# Patient Record
Sex: FEMALE | Race: WHITE | NOT HISPANIC OR LATINO | Employment: FULL TIME | URBAN - NONMETROPOLITAN AREA
[De-identification: names, ages, dates, MRNs, and addresses within clinical notes are randomized per-mention and may not be internally consistent; named-entity substitution may affect disease eponyms.]

---

## 2022-05-03 ENCOUNTER — OFFICE VISIT (OUTPATIENT)
Dept: FAMILY MEDICINE CLINIC | Facility: CLINIC | Age: 37
End: 2022-05-03

## 2022-05-03 VITALS
DIASTOLIC BLOOD PRESSURE: 80 MMHG | HEIGHT: 71 IN | SYSTOLIC BLOOD PRESSURE: 128 MMHG | OXYGEN SATURATION: 95 % | HEART RATE: 69 BPM

## 2022-05-03 DIAGNOSIS — R09.81 NASAL CONGESTION: ICD-10-CM

## 2022-05-03 DIAGNOSIS — H92.03 EAR PAIN, BILATERAL: Primary | ICD-10-CM

## 2022-05-03 PROCEDURE — 99213 OFFICE O/P EST LOW 20 MIN: CPT | Performed by: PHYSICIAN ASSISTANT

## 2022-05-03 RX ORDER — CHLORCYCLIZINE HYDROCHLORIDE AND PSEUDOEPHEDRINE HYDROCHLORIDE 25; 60 MG/1; MG/1
TABLET ORAL
Qty: 20 TABLET | Refills: 0 | Status: SHIPPED | OUTPATIENT
Start: 2022-05-03

## 2022-05-03 RX ORDER — FLUTICASONE PROPIONATE 50 MCG
2 SPRAY, SUSPENSION (ML) NASAL DAILY
Qty: 15.8 ML | Refills: 3 | Status: SHIPPED | OUTPATIENT
Start: 2022-05-03

## 2022-05-03 RX ORDER — METHYLPREDNISOLONE 4 MG/1
TABLET ORAL
Qty: 1 EACH | Refills: 0 | Status: SHIPPED | OUTPATIENT
Start: 2022-05-03

## 2022-05-03 RX ORDER — NORETHINDRONE ACETATE AND ETHINYL ESTRADIOL 1MG-20(21)
KIT ORAL
COMMUNITY
Start: 2022-04-02

## 2022-05-03 RX ORDER — ESOMEPRAZOLE MAGNESIUM 20 MG/1
TABLET, DELAYED RELEASE ORAL
COMMUNITY

## 2022-05-06 NOTE — PROGRESS NOTES
"Chief Complaint  Earache (Patient reports having an earache since Friday, she also has has congestion. No fever. Pt states reports that also has a headache)    Subjective          Ne Byrd presents to [facility]  Patient reports today secondary to having ear pain and nasal congestion.  States she has taken OTC meds however no relief.  Reports no known sick contacts.  Denies any fever, chills, nausea or vomiting      Objective   Vital Signs:  /80 (BP Location: Left arm, Patient Position: Sitting, Cuff Size: Adult)   Pulse 69   Ht 180.3 cm (71\")   SpO2 95%     BMI has not been calculated during today's encounter.       Physical Exam  Vitals and nursing note reviewed.   Constitutional:       General: She is not in acute distress.     Appearance: Normal appearance.   HENT:      Head: Normocephalic.      Right Ear: Hearing and tympanic membrane normal.      Left Ear: Hearing and tympanic membrane normal.   Eyes:      Pupils: Pupils are equal, round, and reactive to light.   Cardiovascular:      Rate and Rhythm: Normal rate and regular rhythm.   Pulmonary:      Effort: Pulmonary effort is normal. No respiratory distress.   Skin:     General: Skin is warm and dry.   Neurological:      Mental Status: She is alert.   Psychiatric:         Mood and Affect: Mood normal.        Result Review :                 Assessment and Plan    Diagnoses and all orders for this visit:    1. Ear pain, bilateral (Primary)  Assessment & Plan:  Patient normal on physical exam advised this could be secondary to allergies or viral. Prescribed steroid taper, stahist and flonase for possible relief. Patient however declined flu and COVID testing this date.      Orders:  -     methylPREDNISolone (MEDROL) 4 MG dose pack; Take as directed on package instructions with food.  Dispense: 1 each; Refill: 0    2. Nasal congestion  Assessment & Plan:  See plan above    Orders:  -     Cancel: COVID-19 RAPID " AG,VERITOR,COR/CORA/PAD/SEFERINO/MAD/FANTA/LAG/TERRIE/ IN-HOUSE,DRY SWAB, 1-2 HR TAT - Swab, Nasal Cavity; Future  -     Cancel: Influenza Antigen, Rapid - Swab, Nasopharynx; Future  -     fluticasone (Flonase) 50 MCG/ACT nasal spray; 2 sprays into the nostril(s) as directed by provider Daily.  Dispense: 15.8 mL; Refill: 3  -     Chlorcyclizine-Pseudoephed (Stahist AD) 25-60 MG tablet; Take 1 tab po BID as needed for drainage and/or congestion.  Dispense: 20 tablet; Refill: 0           Follow Up   No follow-ups on file.  Patient was given instructions and counseling regarding her condition or for health maintenance advice. Please see specific information pulled into the AVS if appropriate.

## 2022-05-06 NOTE — ASSESSMENT & PLAN NOTE
Patient normal on physical exam advised this could be secondary to allergies or viral. Prescribed steroid taper, stahist and flonase for possible relief. Patient however declined flu and COVID testing this date.

## 2023-12-04 ENCOUNTER — TELEPHONE (OUTPATIENT)
Dept: FAMILY MEDICINE CLINIC | Facility: CLINIC | Age: 38
End: 2023-12-04
Payer: COMMERCIAL

## 2023-12-04 NOTE — TELEPHONE ENCOUNTER
Caller: Ne Byrd    Relationship to patient: Self    Best call back number: [phone]     Chief complaint: COUGH, CONGESTION, SINUS PRESSURE, GREEN MUCUS, RUNNY NOSE, TESTED POSITIVE FOR COVID 11-28-23    Type of visit: MYCHART OR OFFICE VISIT    Requested date: AS SOON AS POSSIBLE     If rescheduling, when is the original appointment: 12-8-23     Additional notes:PATIENT IS WILLING TO SEE ANYONE IN THE OFFICE.

## 2023-12-04 NOTE — TELEPHONE ENCOUNTER
Name: Ne Byrd    Relationship: Self    Best Callback Number: [phone]    HUB PROVIDED THE RELAY MESSAGE FROM THE OFFICE   PATIENT VOICED UNDERSTANDING AND HAS NO FURTHER QUESTIONS AT THIS TIME

## 2023-12-14 ENCOUNTER — TELEMEDICINE (OUTPATIENT)
Dept: FAMILY MEDICINE CLINIC | Facility: CLINIC | Age: 38
End: 2023-12-14
Payer: COMMERCIAL

## 2023-12-14 DIAGNOSIS — R05.1 ACUTE COUGH: ICD-10-CM

## 2023-12-14 DIAGNOSIS — J32.9 RECURRENT SINUSITIS: Primary | ICD-10-CM

## 2023-12-14 RX ORDER — DOXYCYCLINE HYCLATE 100 MG/1
100 CAPSULE ORAL 2 TIMES DAILY
Qty: 20 CAPSULE | Refills: 0 | Status: SHIPPED | OUTPATIENT
Start: 2023-12-14

## 2023-12-14 RX ORDER — PREDNISONE 20 MG/1
TABLET ORAL
Qty: 24 TABLET | Refills: 0 | Status: SHIPPED | OUTPATIENT
Start: 2023-12-14

## 2023-12-14 RX ORDER — DEXTROMETHORPHAN HYDROBROMIDE AND PROMETHAZINE HYDROCHLORIDE 15; 6.25 MG/5ML; MG/5ML
5 SYRUP ORAL 4 TIMES DAILY PRN
Qty: 240 ML | Refills: 3 | Status: SHIPPED | OUTPATIENT
Start: 2023-12-14

## 2023-12-14 RX ORDER — FLUCONAZOLE 150 MG/1
150 TABLET ORAL ONCE
Qty: 1 TABLET | Refills: 1 | Status: SHIPPED | OUTPATIENT
Start: 2023-12-14 | End: 2023-12-14

## 2023-12-14 NOTE — ASSESSMENT & PLAN NOTE
Reviewed treatment and discussed further treatment and workup options.  She will start with a prednisone taper and may continue with Stahist days for congestion relief.  If not improving or worsening she will add in doxycycline.    Given prescription for Diflucan due to history of yeast infections with antibiotics.    Discussed side effects and expectations with doxycycline and it can interact with birth control.    Given Phenergan DM for p.m. cough and congestion.    Close follow-up if no improvement or worsening

## 2023-12-14 NOTE — PROGRESS NOTES
Patient was seen today through synchronous audio/video technology. Verbal consent was obtained. The patient was located at home. Vitals signs were not obtained due to lack of home monitoring access.     I was located at our [facility] office for this telehealth visit.    Chief Complaint  No chief complaint on file.    History of Present Illness  Ne Byrd is a 38 y.o. female presenting via video-conference today for congestion, ear pain, sinusitis.    COVID pos 11/28/23.  Worsening sinus pain/pressure.    Started Augmentin for 5 days.  Purulent drainage improved.  Ongoing problems with significant sinus pain and pressure.  Congested cough as well.  She has been on Stahist with Flonase without improvement            The following portions of the patient's history were reviewed and updated as appropriate: allergies, current medications, past family history, past medical history, past social history, past surgical history and problem list.    Review of Systems   Constitutional:  Negative for appetite change, chills, fever and unexpected weight change.   HENT:  Positive for congestion, sinus pressure and sinus pain. Negative for hearing loss.    Eyes:  Negative for visual disturbance.   Respiratory:  Positive for cough. Negative for chest tightness, shortness of breath and wheezing.    Cardiovascular:  Negative for chest pain, palpitations and leg swelling.   Gastrointestinal:  Negative for abdominal pain.   Musculoskeletal:  Negative for arthralgias, back pain and gait problem.   Skin:  Negative for rash.   Neurological:  Negative for dizziness and headaches.   Psychiatric/Behavioral:  Negative for agitation and confusion. The patient is not nervous/anxious.        Objective  There were no vitals taken for this visit.    Physical Exam  Constitutional:       General: She is not in acute distress.     Appearance: Normal appearance. She is not ill-appearing.   HENT:      Head:  Normocephalic and atraumatic.      Right Ear: External ear normal.      Left Ear: External ear normal.      Nose: Nose normal. Congestion present.   Eyes:      Extraocular Movements: Extraocular movements intact.      Conjunctiva/sclera: Conjunctivae normal.      Pupils: Pupils are equal, round, and reactive to light.   Pulmonary:      Effort: Pulmonary effort is normal. No respiratory distress.   Musculoskeletal:         General: Normal range of motion.      Cervical back: Normal range of motion.   Skin:     Findings: No rash.   Neurological:      General: No focal deficit present.      Mental Status: She is alert and oriented to person, place, and time.   Psychiatric:         Mood and Affect: Mood normal.         Behavior: Behavior normal.         Thought Content: Thought content normal.           Assessment/Plan   Diagnoses and all orders for this visit:    1. Recurrent sinusitis (Primary)  Assessment & Plan:  Reviewed treatment and discussed further treatment and workup options.  She will start with a prednisone taper and may continue with Stahist days for congestion relief.  If not improving or worsening she will add in doxycycline.    Given prescription for Diflucan due to history of yeast infections with antibiotics.    Discussed side effects and expectations with doxycycline and it can interact with birth control.    Given Phenergan DM for p.m. cough and congestion.    Close follow-up if no improvement or worsening    Orders:  -     predniSONE (DELTASONE) 20 MG tablet; 3 po QAM x 4d, then 2 po QAM x 4d, then 1 po QAM x 4 d, then stop (Take in AM with food)  Dispense: 24 tablet; Refill: 0  -     doxycycline (VIBRAMYCIN) 100 MG capsule; Take 1 capsule by mouth 2 (Two) Times a Day. (Take 2 hrs after eating with water to prevent GI upset)  Dispense: 20 capsule; Refill: 0    2. Acute cough  -     promethazine-dextromethorphan (PROMETHAZINE-DM) 6.25-15 MG/5ML syrup; Take 5 mL by mouth 4 (Four) Times a Day As  Needed for Cough.  Dispense: 240 mL; Refill: 3    Other orders  -     fluconazole (Diflucan) 150 MG tablet; Take 1 tablet by mouth 1 (One) Time for 1 dose.  Dispense: 1 tablet; Refill: 1        BMI cannot be calculated due to outdated height or weight values.  Please input a current height/weight in Vitals and re-renter BMIFOLLOWUP in Note to pull in correct documentation based on BMI range.       Return if symptoms worsen or fail to improve.    Zachary Bueno MD

## 2023-12-20 ENCOUNTER — PATIENT MESSAGE (OUTPATIENT)
Dept: FAMILY MEDICINE CLINIC | Facility: CLINIC | Age: 38
End: 2023-12-20
Payer: COMMERCIAL

## 2023-12-20 DIAGNOSIS — J32.9 RECURRENT SINUSITIS: ICD-10-CM

## 2023-12-20 RX ORDER — PREDNISONE 20 MG/1
TABLET ORAL
Qty: 24 TABLET | Refills: 0 | Status: CANCELLED | OUTPATIENT
Start: 2023-12-20

## 2023-12-21 RX ORDER — BROMPHENIRAMINE MALEATE, PSEUDOEPHEDRINE HYDROCHLORIDE, AND DEXTROMETHORPHAN HYDROBROMIDE 2; 30; 10 MG/5ML; MG/5ML; MG/5ML
5 SYRUP ORAL 4 TIMES DAILY PRN
Qty: 118 ML | Refills: 3 | Status: SHIPPED | OUTPATIENT
Start: 2023-12-21

## 2023-12-21 NOTE — TELEPHONE ENCOUNTER
From: Ne Byrd  To: Zachary Bueno  Sent: 12/20/2023 5:56 PM EST  Subject: Ongoing cough     Hello!!! This message is for Dr. Bueno. I have used almost all of the cough syrup prescribed to me. It usually helps me...however, I'm still experiencing a cough all day long and have been sick now for weeks. Have also taken the steroid and the antibiotic prescribed. Going into the holidays, I'm trying everything to get better!! Is there a different and stronger/better cough syrup you can call in for me? Thanks so much!!!

## 2023-12-21 NOTE — TELEPHONE ENCOUNTER
I sent in [facility] DM for cough/congestion but the prednisone refill request was not filled because she should not take another course of steroids    I hope the cough syrup change helps and this has a decongestant to help with congestion as well as cough medication and antihistamine

## 2024-01-08 ENCOUNTER — PATIENT MESSAGE (OUTPATIENT)
Dept: FAMILY MEDICINE CLINIC | Facility: CLINIC | Age: 39
End: 2024-01-08
Payer: COMMERCIAL

## 2024-01-08 RX ORDER — BROMPHENIRAMINE MALEATE, PSEUDOEPHEDRINE HYDROCHLORIDE, AND DEXTROMETHORPHAN HYDROBROMIDE 2; 30; 10 MG/5ML; MG/5ML; MG/5ML
5 SYRUP ORAL 4 TIMES DAILY PRN
Qty: 118 ML | Refills: 3 | Status: SHIPPED | OUTPATIENT
Start: 2024-01-08 | End: 2024-01-11 | Stop reason: SDUPTHER

## 2024-01-11 RX ORDER — BROMPHENIRAMINE MALEATE, PSEUDOEPHEDRINE HYDROCHLORIDE, AND DEXTROMETHORPHAN HYDROBROMIDE 2; 30; 10 MG/5ML; MG/5ML; MG/5ML
5 SYRUP ORAL 4 TIMES DAILY PRN
Qty: 118 ML | Refills: 3 | Status: SHIPPED | OUTPATIENT
Start: 2024-01-11

## 2024-01-11 NOTE — TELEPHONE ENCOUNTER
"From: Ne Byrd  To: Zachary Bueno  Sent: 1/8/2024 1:54 PM EST  Subject: Ongoing cough    Hey Dr. Bueno... I was never able to pickup the bromphed DM. Reese said it wasn't called in. My cough is not bad but hanging on. I heard in the news about this \"100 day cough\" going around...not sure of your thoughts, but I know I've been coughing now for over 50 days. Again it's not nearly as severe as it was early on, but is hanging on and worse at night (Keeps me up). I'd like to try the medication you called in or if you think something else before we do any X-rays, etc. what do you think? Thanks!!!  "

## 2024-03-06 ENCOUNTER — TELEPHONE (OUTPATIENT)
Dept: FAMILY MEDICINE CLINIC | Facility: CLINIC | Age: 39
End: 2024-03-06
Payer: COMMERCIAL

## 2024-03-06 NOTE — TELEPHONE ENCOUNTER
Caller: Ne Byrd    Relationship to patient: Self    Best call back number: [phone]     Patient is needing: PATIENT IS NEEDING INFORMATION ON OBTAINING A PERMANENT HANDICAP PLACARD FOR HER HIP ISSUES THAT WILL REMAIN ONGOING. SHE WAS TOLD HER PRIMARY CARE PROVIDER WOULD NEED TO ISSUE THIS TO HER.     PATIENT STATES SHE HAS BEEN DIAGNOSED WITH HIP DYSPLASIA AND A LABRAL TEAR, AND SHE CAN SEND OVER HER PAPERWORK AND CLINICAL NOTES IF NECESSARY.     PLEASE CALL PATIENT BACK WITH NEXT STEPS, IF NO ANSWER LEAVE A DETAILED MESSAGE.

## 2024-03-07 NOTE — TELEPHONE ENCOUNTER
Please have her setup a telehealth visit - I can get everything documented in her note and discuss the process for handicap permit completion.

## 2024-03-12 ENCOUNTER — TELEMEDICINE (OUTPATIENT)
Dept: FAMILY MEDICINE CLINIC | Facility: CLINIC | Age: 39
End: 2024-03-12
Payer: COMMERCIAL

## 2024-03-12 DIAGNOSIS — M25.551 BILATERAL HIP PAIN: Primary | ICD-10-CM

## 2024-03-12 DIAGNOSIS — S73.191A TEAR OF RIGHT ACETABULAR LABRUM, INITIAL ENCOUNTER: ICD-10-CM

## 2024-03-12 DIAGNOSIS — Q65.89 HIP DYSPLASIA: ICD-10-CM

## 2024-03-12 DIAGNOSIS — M25.552 BILATERAL HIP PAIN: Primary | ICD-10-CM

## 2024-03-12 NOTE — PROGRESS NOTES
Patient was seen today through synchronous audio/video technology. Verbal consent was obtained. The patient was located at home. Vitals signs were not obtained due to lack of home monitoring access.     I was located at our [facility] office for this telehealth visit.    Chief Complaint  Bilateral hip pain, diagnosis of congenital hip dysplasia, R hip labral tear    History of Present Illness  Ne Byrd is a 38 y.o. female presenting via video-conference today for follow-up visit regarding problems with recurrent bilateral hip pain which has been greater on the right side.  She did have workup with Dr. 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    Unfortunately given her hip dysplasia contributed to the labral tear they do not feel that repairing the labral tear will be very effective because the underlying structural differences will cause a repeat tear.    She has had flareups with both hip and lower back pain and has difficulty sleeping at night with this pain.  She is affected with walking even short distances with her hip and back pain and would like to request a handicap permit to help reduce the distance that she has to travel when parking in a parking lot.    She does have follow-up with interventional pain management to discuss some injection options due to her ongoing pain related to her congenital hip dysplasia and labral tear.    The following portions of the patient's history were reviewed and updated as appropriate: allergies, current medications, past family history, past medical history, past social history, past surgical history and problem list.    Review of Systems   Constitutional:  Negative for appetite change, chills, fever and unexpected weight change.   HENT:  Negative for hearing loss.    Eyes:  Negative for visual disturbance.   Respiratory:  Negative for  chest tightness, shortness of breath and wheezing.    Cardiovascular:  Negative for chest pain, palpitations and leg swelling.   Gastrointestinal:  Negative for abdominal pain.   Musculoskeletal:  Positive for arthralgias, back pain and gait problem.        See HPI   Skin:  Negative for rash.   Neurological:  Negative for dizziness and headaches.   Psychiatric/Behavioral:  Positive for sleep disturbance. Negative for agitation and confusion. The patient is not nervous/anxious.        Objective  There were no vitals taken for this visit.    Physical Exam  Constitutional:       General: She is not in acute distress.     Appearance: Normal appearance. She is not ill-appearing.   HENT:      Head: Normocephalic and atraumatic.      Right Ear: External ear normal.      Left Ear: External ear normal.      Nose: Nose normal.   Eyes:      Extraocular Movements: Extraocular movements intact.      Conjunctiva/sclera: Conjunctivae normal.      Pupils: Pupils are equal, round, and reactive to light.   Pulmonary:      Effort: Pulmonary effort is normal. No respiratory distress.   Musculoskeletal:      Cervical back: Normal range of motion.      Comments: Ongoing problems with hip pain greater on right side at site of labral tear.  New diagnosis of congenital hip dysplasia and right-sided labral tear since our last visit   Skin:     Findings: No rash.   Neurological:      General: No focal deficit present.      Mental Status: She is alert and oriented to person, place, and time.   Psychiatric:         Mood and Affect: Mood normal.         Behavior: Behavior normal.         Thought Content: Thought content normal.           Assessment/Plan   Diagnoses and all orders for this visit:    1. Bilateral hip pain (Primary)  Assessment & Plan:  We discussed together her workup with orthopedics here in [place] and referral to  orthopedics with diagnosis of bilateral hip congenital dysplasia and right labral tear.    This has been very  frustrating news given that it is a long-term problem and there is not a simple solution but we are hopeful that interventional pain management can offer some conservative treatment that will help alleviate some of her symptoms for a while but she may need eventually hip replacement surgery.    We did discuss the handicap permit and this was completed today and she will stop by the office and pick it up so we can help with some of the distances that she has to walk when parking her car.    She does understand that I am here to help in any way and will keep me up-to-date regarding her interventional pain consultation and treatment recommendations from her orthopedic specialist at       2. Hip dysplasia  Assessment & Plan:  See above      3. Tear of right acetabular labrum, initial encounter  Assessment & Plan:  See above             Return if symptoms worsen or fail to improve.    Zachary Bueno MD

## 2024-03-12 NOTE — ASSESSMENT & PLAN NOTE
We discussed together her workup with orthopedics here in [place] and referral to [facility] with diagnosis of bilateral hip congenital dysplasia and right labral tear.    This has been very frustrating news given that it is a long-term problem and there is not a simple solution but we are hopeful that interventional pain management can offer some conservative treatment that will help alleviate some of her symptoms for a while but she may need eventually hip replacement surgery.    We did discuss the handicap permit and this was completed today and she will stop by the office and pick it up so we can help with some of the distances that she has to walk when parking her car.    She does understand that I am here to help in any way and will keep me up-to-date regarding her interventional pain consultation and treatment recommendations from her orthopedic specialist at

## 2024-06-25 ENCOUNTER — PATIENT MESSAGE (OUTPATIENT)
Dept: FAMILY MEDICINE CLINIC | Facility: CLINIC | Age: 39
End: 2024-06-25
Payer: COMMERCIAL

## 2024-06-26 NOTE — TELEPHONE ENCOUNTER
From: Ne Byrd  To: Zachary Bueno  Sent: 6/25/2024 1:50 PM EDT  Subject: Handicap parking    Hi Dr. Bueno. I did a virtual visit with you recently to discuss and get your approval for a handicapped sticker due to my diagnosed his dysplasia and labral tear. 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 I have attached it or can bring it by if that's better. Would you care to sign this for me? Thanks so much

## 2024-06-28 ENCOUNTER — OFFICE VISIT (OUTPATIENT)
Dept: FAMILY MEDICINE CLINIC | Facility: CLINIC | Age: 39
End: 2024-06-28
Payer: COMMERCIAL

## 2024-06-28 VITALS
TEMPERATURE: 97.1 F | BODY MASS INDEX: 34.3 KG/M2 | DIASTOLIC BLOOD PRESSURE: 74 MMHG | SYSTOLIC BLOOD PRESSURE: 106 MMHG | OXYGEN SATURATION: 94 % | HEART RATE: 73 BPM | WEIGHT: 245 LBS | HEIGHT: 71 IN

## 2024-06-28 DIAGNOSIS — R50.9 FEVER AND CHILLS: ICD-10-CM

## 2024-06-28 DIAGNOSIS — B34.9 ACUTE VIRAL SYNDROME: Primary | ICD-10-CM

## 2024-06-28 LAB
BILIRUB BLD-MCNC: NEGATIVE MG/DL
CLARITY, POC: CLEAR
COLOR UR: YELLOW
EXPIRATION DATE: NORMAL
EXPIRATION DATE: NORMAL
FLUAV AG UPPER RESP QL IA.RAPID: NOT DETECTED
FLUBV AG UPPER RESP QL IA.RAPID: NOT DETECTED
GLUCOSE UR STRIP-MCNC: NEGATIVE MG/DL
INTERNAL CONTROL: NORMAL
KETONES UR QL: NEGATIVE
LEUKOCYTE EST, POC: NEGATIVE
Lab: NORMAL
Lab: NORMAL
NITRITE UR-MCNC: NEGATIVE MG/ML
PH UR: 6 [PH] (ref 5–8)
PROT UR STRIP-MCNC: NEGATIVE MG/DL
RBC # UR STRIP: NEGATIVE /UL
SARS-COV-2 AG UPPER RESP QL IA.RAPID: NOT DETECTED
SP GR UR: 1 (ref 1–1.03)
UROBILINOGEN UR QL: NORMAL

## 2024-06-28 PROCEDURE — 81003 URINALYSIS AUTO W/O SCOPE: CPT | Performed by: PHYSICIAN ASSISTANT

## 2024-06-28 PROCEDURE — 87428 SARSCOV & INF VIR A&B AG IA: CPT | Performed by: PHYSICIAN ASSISTANT

## 2024-06-28 PROCEDURE — 99213 OFFICE O/P EST LOW 20 MIN: CPT | Performed by: PHYSICIAN ASSISTANT

## 2024-06-28 RX ORDER — ESOMEPRAZOLE MAGNESIUM 20 MG/1
GRANULE, DELAYED RELEASE ORAL
COMMUNITY

## 2024-06-28 RX ORDER — TIZANIDINE 4 MG/1
4 TABLET ORAL NIGHTLY PRN
COMMUNITY
End: 2024-06-28 | Stop reason: SDUPTHER

## 2024-06-28 RX ORDER — ONDANSETRON 4 MG/1
4 TABLET, FILM COATED ORAL EVERY 8 HOURS PRN
Qty: 20 TABLET | Refills: 0 | Status: SHIPPED | OUTPATIENT
Start: 2024-06-28

## 2024-06-28 RX ORDER — TIZANIDINE 4 MG/1
4 TABLET ORAL NIGHTLY PRN
Qty: 30 TABLET | Refills: 3 | Status: SHIPPED | OUTPATIENT
Start: 2024-06-28

## 2024-06-28 RX ORDER — TRAMADOL HYDROCHLORIDE 50 MG/1
TABLET ORAL 2 TIMES DAILY
COMMUNITY
Start: 2024-06-11

## 2024-06-28 RX ORDER — GABAPENTIN 600 MG/1
TABLET ORAL
COMMUNITY
Start: 2024-06-06

## 2024-07-05 NOTE — PROGRESS NOTES
"Chief Complaint  Back Pain (Started in left side now in both sides.), Nausea (Stomach hurts and feels nauseas. Woke up with really bad headache.), and Menstrual Problem    Subjective        Ne Byrd presents to [facility]  History of Present Illness  Patient reports this date secondary to not feeling well starting this morning.  Patient reports feeling feverish and having chills.  Denies any known temperature.  Patient reports she is menstruating and has low back pain.  Reports fullness in lower abdomen is concerned for UTI.  States she has nausea.  Denies any SOB, chest pain or difficulty breathing.  Denies any known sick contacts  Back Pain    Nausea  Associated symptoms include nausea.   Menstrual Problem  Associated symptoms include nausea.       Objective   Vital Signs:  /74 (BP Location: Left arm, Patient Position: Sitting, Cuff Size: Adult)   Pulse 73   Temp 97.1 °F (36.2 °C) (Infrared)   Ht 180.3 cm (70.98\")   Wt 111 kg (245 lb)   SpO2 94%   BMI 34.19 kg/m²   Estimated body mass index is 34.19 kg/m² as calculated from the following:    Height as of this encounter: 180.3 cm (70.98\").    Weight as of this encounter: 111 kg (245 lb).             Physical Exam  Vitals and nursing note reviewed.   Constitutional:       General: She is not in acute distress.     Appearance: Normal appearance. She is not ill-appearing.   HENT:      Head: Normocephalic.      Right Ear: Hearing, tympanic membrane and ear canal normal.      Left Ear: Hearing, tympanic membrane and ear canal normal.      Mouth/Throat:      Pharynx: No oropharyngeal exudate.   Eyes:      Pupils: Pupils are equal, round, and reactive to light.   Cardiovascular:      Rate and Rhythm: Normal rate and regular rhythm.   Pulmonary:      Effort: Pulmonary effort is normal. No respiratory distress.   Abdominal:      Tenderness: There is no right CVA tenderness or left CVA tenderness.   Musculoskeletal:         " General: Normal range of motion.   Skin:     General: Skin is warm and dry.   Neurological:      Mental Status: She is alert.   Psychiatric:         Mood and Affect: Mood normal.      Result Review :                     Assessment and Plan     Diagnoses and all orders for this visit:    1. Acute viral syndrome (Primary)  Assessment & Plan:  Patient negative for covid, flu and UA WNLs.  Advised patient it is early for COVID/flu detection if symptoms continue consider retesting.  Advised patient this is likely a viral syndrome exacerbated by PMS.  Provided patient with zofran and discussed importance of hydration.  Recommended OTC meds for headache and body aches.  Discussed signs of worsening symptoms and advised ER should they occur.    Orders:  -     ondansetron (Zofran) 4 MG tablet; Take 1 tablet by mouth Every 8 (Eight) Hours As Needed for Nausea.  Dispense: 20 tablet; Refill: 0    2. Fever and chills  Assessment & Plan:  See plan above    Orders:  -     POC Urinalysis Dipstick, Automated  -     POCT SARS-CoV-2 Antigen LUC + Flu             Follow Up     Return if symptoms worsen or fail to improve.  Patient was given instructions and counseling regarding her condition or for health maintenance advice. Please see specific information pulled into the AVS if appropriate.

## 2024-07-05 NOTE — ASSESSMENT & PLAN NOTE
Patient negative for covid, flu and UA WNLs.  Advised patient it is early for COVID/flu detection if symptoms continue consider retesting.  Advised patient this is likely a viral syndrome exacerbated by PMS.  Provided patient with zofran and discussed importance of hydration.  Recommended OTC meds for headache and body aches.  Discussed signs of worsening symptoms and advised ER should they occur.

## 2024-08-27 ENCOUNTER — OFFICE VISIT (OUTPATIENT)
Dept: FAMILY MEDICINE CLINIC | Facility: CLINIC | Age: 39
End: 2024-08-27
Payer: COMMERCIAL

## 2024-08-27 VITALS
DIASTOLIC BLOOD PRESSURE: 70 MMHG | HEART RATE: 68 BPM | WEIGHT: 247.1 LBS | OXYGEN SATURATION: 98 % | HEIGHT: 71 IN | BODY MASS INDEX: 34.59 KG/M2 | SYSTOLIC BLOOD PRESSURE: 108 MMHG

## 2024-08-27 DIAGNOSIS — Q65.89 HIP DYSPLASIA: Chronic | ICD-10-CM

## 2024-08-27 DIAGNOSIS — Z12.31 SCREENING MAMMOGRAM FOR BREAST CANCER: ICD-10-CM

## 2024-08-27 DIAGNOSIS — M25.551 BILATERAL HIP PAIN: Chronic | ICD-10-CM

## 2024-08-27 DIAGNOSIS — S73.191D TEAR OF RIGHT ACETABULAR LABRUM, SUBSEQUENT ENCOUNTER: Chronic | ICD-10-CM

## 2024-08-27 DIAGNOSIS — Z13.1 DIABETES MELLITUS SCREENING: ICD-10-CM

## 2024-08-27 DIAGNOSIS — K21.9 GERD WITHOUT ESOPHAGITIS: ICD-10-CM

## 2024-08-27 DIAGNOSIS — M62.830 BACK SPASM: ICD-10-CM

## 2024-08-27 DIAGNOSIS — Z79.899 HIGH RISK MEDICATION USE: ICD-10-CM

## 2024-08-27 DIAGNOSIS — Z00.00 GENERAL MEDICAL EXAM: Primary | ICD-10-CM

## 2024-08-27 DIAGNOSIS — M25.552 BILATERAL HIP PAIN: Chronic | ICD-10-CM

## 2024-08-27 DIAGNOSIS — G47.00 INSOMNIA, PERSISTENT: ICD-10-CM

## 2024-08-27 DIAGNOSIS — E66.09 CLASS 1 OBESITY DUE TO EXCESS CALORIES WITHOUT SERIOUS COMORBIDITY WITH BODY MASS INDEX (BMI) OF 34.0 TO 34.9 IN ADULT: ICD-10-CM

## 2024-08-27 PROBLEM — H92.03 EAR PAIN, BILATERAL: Status: RESOLVED | Noted: 2022-05-03 | Resolved: 2024-08-27

## 2024-08-27 PROBLEM — R50.9 FEVER AND CHILLS: Status: RESOLVED | Noted: 2024-06-28 | Resolved: 2024-08-27

## 2024-08-27 PROBLEM — B34.9 ACUTE VIRAL SYNDROME: Status: RESOLVED | Noted: 2024-06-28 | Resolved: 2024-08-27

## 2024-08-27 PROBLEM — R09.81 NASAL CONGESTION: Status: RESOLVED | Noted: 2022-05-03 | Resolved: 2024-08-27

## 2024-08-27 PROBLEM — E66.811 CLASS 1 OBESITY DUE TO EXCESS CALORIES WITHOUT SERIOUS COMORBIDITY WITH BODY MASS INDEX (BMI) OF 34.0 TO 34.9 IN ADULT: Status: ACTIVE | Noted: 2024-08-27

## 2024-08-27 PROBLEM — J32.9 RECURRENT SINUSITIS: Status: RESOLVED | Noted: 2023-12-14 | Resolved: 2024-08-27

## 2024-08-27 PROBLEM — R05.1 ACUTE COUGH: Status: RESOLVED | Noted: 2023-12-14 | Resolved: 2024-08-27

## 2024-08-27 PROCEDURE — 99396 PREV VISIT EST AGE 40-64: CPT | Performed by: FAMILY MEDICINE

## 2024-08-27 RX ORDER — CELECOXIB 100 MG/1
100 CAPSULE ORAL 2 TIMES DAILY
COMMUNITY
Start: 2024-07-11 | End: 2024-08-27

## 2024-08-27 RX ORDER — ESOMEPRAZOLE MAGNESIUM 40 MG/1
40 CAPSULE, DELAYED RELEASE ORAL
Qty: 90 CAPSULE | Refills: 3 | Status: SHIPPED | OUTPATIENT
Start: 2024-08-27

## 2024-08-27 RX ORDER — IBUPROFEN 800 MG/1
800 TABLET, FILM COATED ORAL 3 TIMES DAILY
Start: 2024-08-27

## 2024-08-27 RX ORDER — TRAZODONE HYDROCHLORIDE 50 MG/1
50 TABLET, FILM COATED ORAL NIGHTLY PRN
Qty: 30 TABLET | Refills: 11 | Status: SHIPPED | OUTPATIENT
Start: 2024-08-27

## 2024-08-27 RX ORDER — HYDROCODONE BITARTRATE AND ACETAMINOPHEN 7.5; 325 MG/1; MG/1
1 TABLET ORAL EVERY 8 HOURS PRN
COMMUNITY
Start: 2024-08-22

## 2024-08-27 RX ORDER — TRAZODONE HYDROCHLORIDE 50 MG/1
1 TABLET, FILM COATED ORAL NIGHTLY
COMMUNITY
Start: 2024-07-05 | End: 2024-08-27 | Stop reason: SDUPTHER

## 2024-08-27 NOTE — ASSESSMENT & PLAN NOTE
Scheduling consultation for review of her MRI and treatment options with Dr. Marie at [facility]   UTI (urinary tract infection)

## 2024-08-27 NOTE — ASSESSMENT & PLAN NOTE
Scheduling consultation for review of her MRI and treatment options with Dr. Marie at [facility]

## 2024-08-27 NOTE — ASSESSMENT & PLAN NOTE
Scheduling consultation for review of her MRI and treatment options with Dr. Marie at [facility]

## 2024-08-27 NOTE — ASSESSMENT & PLAN NOTE
Patient's (Body mass index is 34.46 kg/m².) indicates that they are obese (BMI >30) with health conditions that include osteoarthritis . Weight is unchanged. BMI  is above average; BMI management plan is completed. We discussed portion control and increasing exercise.

## 2024-08-27 NOTE — PROGRESS NOTES
Chief Complaint  Physical     Subjective    History of Present Illness:  Ne Byrd is a 39 y.o. female who presents today for physical exam and for followup regarding bilateral hip pain/hip dysplasia and history of labral tear.     Doing well overall since last telehealth follow-up visit regarding problems with recurrent bilateral hip pain which has been greater on the right side.  She did have workup with Dr. Gaston here in [place] followed by evaluation at  orthopedics and diagnosis of bilateral hip dysplasia with right-sided labral tear.    Unfortunately given her hip dysplasia contributed to the labral tear they do not feel that repairing the labral tear will be very effective because the underlying structural differences will cause a repeat tear.  They did discuss hip replacement but decided to hold off given her young age.  She does have considerable ongoing daily pain despite treatment from pain management with ibuprofen, Nexium, and hydrocodone.  She is interested in second opinion with Dr. Alvarenga with [facility].    She does use tizanidine infrequently for back spasms but would like this refilled.    We did discuss with her high-dose ibuprofen need to monitor renal function carefully and also be on something for stomach and also protection.  She has been on Nexium over-the-counter but would like a prescription to see if her insurance would cover this.    She is willing to get a check on her B12, magnesium, and vitamin D today given chronic PPI treatment.    Trazodone does work well intermittently for insomnia.    Declines Tdap.    Plans for flu shot at her pharmacy.    Schedule with women's care for her pelvic exam and Pap smear.  She is getting scheduled at [facility] for her first mammogram.    No family history of colon cancer in a parent or sibling so is planning for her colonoscopy at age 44.    She does have follow-up with interventional pain management to  "continue with injections and pain management with ibuprofen, gabapentin, and hydrocodone.      Objective   Vital Signs:   /70 (BP Location: Left arm, Patient Position: Sitting, Cuff Size: Large Adult)   Pulse 68   Ht 180.3 cm (71\")   Wt 112 kg (247 lb 1.6 oz)   SpO2 98%   BMI 34.46 kg/m²     Review of Systems   Constitutional:  Negative for appetite change, chills and fever.   HENT:  Negative for hearing loss.    Eyes:  Negative for blurred vision.   Respiratory:  Negative for chest tightness.    Cardiovascular:  Negative for chest pain.   Gastrointestinal:  Negative for abdominal pain.   Musculoskeletal:  Positive for arthralgias, back pain and gait problem.        Daily chronic right hip pain.  See HPI   Skin:  Negative for rash.   Psychiatric/Behavioral:  Positive for sleep disturbance. Negative for depressed mood.        Past History:  Medical History: has a past medical history of Arthritis, Headache, and Irritable bowel syndrome.   Surgical History: has a past surgical history that includes Dilation and curettage of uterus (2012); Dilation and curettage of uterus; and Colonoscopy.   Family History: family history includes Breast cancer in an other family member; Diabetes in her paternal grandfather.   Social History: reports that she has quit smoking. Her smoking use included cigarettes. She started smoking about 15 years ago. She has a 3.9 pack-year smoking history. She has never used smokeless tobacco. She reports current alcohol use of about 3.0 standard drinks of alcohol per week. She reports that she does not use drugs.      Current Outpatient Medications:     Blisovi FE 1/20 1-20 MG-MCG per tablet, , Disp: , Rfl:     gabapentin (NEURONTIN) 600 MG tablet, , Disp: , Rfl:     HYDROcodone-acetaminophen (NORCO) 7.5-325 MG per tablet, Take 1 tablet by mouth Every 8 (Eight) Hours As Needed for Mild Pain or Moderate Pain., Disp: , Rfl:     ibuprofen (ADVIL,MOTRIN) 800 MG tablet, Take 1 tablet by mouth " 3 times a day., Disp: , Rfl:     tiZANidine (ZANAFLEX) 4 MG tablet, Take 1 tablet by mouth At Night As Needed for Muscle Spasms., Disp: 30 tablet, Rfl: 3    traZODone (DESYREL) 50 MG tablet, Take 1 tablet by mouth At Night As Needed for Sleep., Disp: 30 tablet, Rfl: 11    esomeprazole (nexIUM) 40 MG capsule, Take 1 capsule by mouth Every Morning Before Breakfast., Disp: 90 capsule, Rfl: 3    Allergies: Patient has no known allergies.    Physical Exam  Constitutional:       Appearance: She is obese.   HENT:      Head: Normocephalic.      Right Ear: Tympanic membrane, ear canal and external ear normal.      Left Ear: Tympanic membrane, ear canal and external ear normal.      Nose: Nose normal.      Mouth/Throat:      Mouth: Mucous membranes are moist.      Pharynx: Oropharynx is clear.   Eyes:      Extraocular Movements: Extraocular movements intact.      Conjunctiva/sclera: Conjunctivae normal.      Pupils: Pupils are equal, round, and reactive to light.   Cardiovascular:      Rate and Rhythm: Normal rate and regular rhythm.      Heart sounds: Normal heart sounds. No murmur heard.     No friction rub. No gallop.   Pulmonary:      Effort: Pulmonary effort is normal.      Breath sounds: Normal breath sounds.   Musculoskeletal:      Cervical back: Normal range of motion.      Comments: Ongoing problems with chronic right hip pain.  Slow but stable gait with difficulty changing positions from sitting to standing due to her chronic right hip pain.  Interested in second opinion with Dr. Penaloza at [facility]   Skin:     General: Skin is warm and dry.   Neurological:      General: No focal deficit present.      Mental Status: She is alert and oriented to person, place, and time.   Psychiatric:         Mood and Affect: Mood normal.         Behavior: Behavior normal.         Thought Content: Thought content normal.          Result Review                   Assessment and Plan  Diagnoses and all orders for this  visit:    1. General medical exam (Primary)  Assessment & Plan:  Discussed together health maintenance and screening along with vaccination options and healthy diet and exercise habits as part of the preventative counseling at their physical exam today.     Declines Tdap.    Plans for flu shot at her pharmacy.    Scheduled for pelvic and Pap smear with breast exam with women's care [facility] the [facility].    Scheduled for her first mammogram this fall at [facility].    Plans for colonoscopy at 44 given no family history of colon cancer.    Orders:  -     CBC Auto Differential; Future  -     Comprehensive Metabolic Panel; Future  -     Lipid Panel; Future  -     TSH; Future  -     T4, Free; Future  -     CBC Auto Differential  -     Comprehensive Metabolic Panel  -     Lipid Panel  -     TSH  -     T4, Free    2. Diabetes mellitus screening  -     Hemoglobin A1c; Future  -     Hemoglobin A1c    3. Screening mammogram for breast cancer    4. Bilateral hip pain  Assessment & Plan:  Scheduling consultation for review of her MRI and treatment options with Dr. Marie at [facility]    Orders:  -     ibuprofen (ADVIL,MOTRIN) 800 MG tablet; Take 1 tablet by mouth 3 times a day.  -     Ambulatory Referral to Orthopedic Surgery    5. Hip dysplasia  Assessment & Plan:  Scheduling consultation for review of her MRI and treatment options with Dr. Marie at [facility]    Orders:  -     ibuprofen (ADVIL,MOTRIN) 800 MG tablet; Take 1 tablet by mouth 3 times a day.    6. Tear of right acetabular labrum, subsequent encounter  Assessment & Plan:  Scheduling consultation for review of her MRI and treatment options with Dr. aMrie at [facility]    Orders:  -     ibuprofen (ADVIL,MOTRIN) 800 MG tablet; Take 1 tablet by mouth 3 times a day.  -     Ambulatory Referral to Orthopedic Surgery    7. GERD without esophagitis  -     esomeprazole (nexIUM) 40 MG capsule; Take 1 capsule by mouth Every  Morning Before Breakfast.  Dispense: 90 capsule; Refill: 3    8. High risk medication use  -     esomeprazole (nexIUM) 40 MG capsule; Take 1 capsule by mouth Every Morning Before Breakfast.  Dispense: 90 capsule; Refill: 3  -     Vitamin B12; Future  -     Vitamin D,25-Hydroxy; Future  -     Magnesium; Future  -     Vitamin B12  -     Vitamin D,25-Hydroxy  -     Magnesium    9. Back spasm  -     tiZANidine (ZANAFLEX) 4 MG tablet; Take 1 tablet by mouth At Night As Needed for Muscle Spasms.  Dispense: 30 tablet; Refill: 3    10. Insomnia, persistent  -     traZODone (DESYREL) 50 MG tablet; Take 1 tablet by mouth At Night As Needed for Sleep.  Dispense: 30 tablet; Refill: 11    11. Class 1 obesity due to excess calories without serious comorbidity with body mass index (BMI) of 34.0 to 34.9 in adult  Assessment & Plan:  Patient's (Body mass index is 34.46 kg/m².) indicates that they are obese (BMI >30) with health conditions that include osteoarthritis . Weight is unchanged. BMI  is above average; BMI management plan is completed. We discussed portion control and increasing exercise.                     Follow Up  Return in about 1 year (around 8/27/2025) for Annual physical.    Zachary Bueno MD  Answers submitted by the patient for this visit:  Other (Submitted on 8/27/2024)  Please describe your symptoms.: Seen by another provider last month and she suggested I start having routine checkups with my regular doctor and have bloodwork done to be sure everything is good.  Have you had these symptoms before?: No  How long have you been having these symptoms?: Greater than 2 weeks  Please list any medications you are currently taking for this condition.: 3x a day Gabapentin 600mg, 3x a day ibuprofen 800mg, 3x a day hydrocodone 7., daily nexium 20mg, daily birth control (bhavani fried 1/20), trazadone 50mg (as needed for sleep)  Primary Reason for Visit (Submitted on 8/27/2024)  What is the primary reason for  your visit?: Other

## 2024-08-27 NOTE — ASSESSMENT & PLAN NOTE
Discussed together health maintenance and screening along with vaccination options and healthy diet and exercise habits as part of the preventative counseling at their physical exam today.     Declines Tdap.    Plans for flu shot at her pharmacy.    Sched[facility] pelvic and Pap smear [facility]    Scheduled for her first mammogram this fall at [facility].    Plans for colonoscopy at 44 given no family history of colon cancer.

## 2024-08-28 LAB
ALBUMIN SERPL-MCNC: 4.7 G/DL (ref 3.9–4.9)
ALP SERPL-CCNC: 100 IU/L (ref 44–121)
ALT SERPL-CCNC: 16 IU/L (ref 0–32)
AST SERPL-CCNC: 18 IU/L (ref 0–40)
BASOPHILS # BLD AUTO: 0.1 X10E3/UL (ref 0–0.2)
BASOPHILS NFR BLD AUTO: 1 %
BILIRUB SERPL-MCNC: 0.8 MG/DL (ref 0–1.2)
BUN SERPL-MCNC: 12 MG/DL (ref 6–24)
BUN/CREAT SERPL: 13 (ref 9–23)
CALCIUM SERPL-MCNC: 9.9 MG/DL (ref 8.7–10.2)
CHLORIDE SERPL-SCNC: 101 MMOL/L (ref 96–106)
CHOLEST SERPL-MCNC: 236 MG/DL (ref 100–199)
CO2 SERPL-SCNC: 23 MMOL/L (ref 20–29)
CREAT SERPL-MCNC: 0.92 MG/DL (ref 0.57–1)
EGFRCR SERPLBLD CKD-EPI 2021: 81 ML/MIN/1.73
EOSINOPHIL # BLD AUTO: 0.2 X10E3/UL (ref 0–0.4)
EOSINOPHIL NFR BLD AUTO: 2 %
ERYTHROCYTE [DISTWIDTH] IN BLOOD BY AUTOMATED COUNT: 12.8 % (ref 11.7–15.4)
GLOBULIN SER CALC-MCNC: 3 G/DL (ref 1.5–4.5)
GLUCOSE SERPL-MCNC: 96 MG/DL (ref 70–99)
HBA1C MFR BLD: 5.4 % (ref 4.8–5.6)
HCT VFR BLD AUTO: 48.5 % (ref 34–46.6)
HDLC SERPL-MCNC: 54 MG/DL
HGB BLD-MCNC: 15.8 G/DL (ref 11.1–15.9)
IMM GRANULOCYTES # BLD AUTO: 0 X10E3/UL (ref 0–0.1)
IMM GRANULOCYTES NFR BLD AUTO: 0 %
LDLC SERPL CALC-MCNC: 156 MG/DL (ref 0–99)
LYMPHOCYTES # BLD AUTO: 1.8 X10E3/UL (ref 0.7–3.1)
LYMPHOCYTES NFR BLD AUTO: 27 %
MCH RBC QN AUTO: 31.7 PG (ref 26.6–33)
MCHC RBC AUTO-ENTMCNC: 32.6 G/DL (ref 31.5–35.7)
MCV RBC AUTO: 97 FL (ref 79–97)
MONOCYTES # BLD AUTO: 0.3 X10E3/UL (ref 0.1–0.9)
MONOCYTES NFR BLD AUTO: 5 %
NEUTROPHILS # BLD AUTO: 4.3 X10E3/UL (ref 1.4–7)
NEUTROPHILS NFR BLD AUTO: 65 %
PLATELET # BLD AUTO: 318 X10E3/UL (ref 150–450)
POTASSIUM SERPL-SCNC: 4.7 MMOL/L (ref 3.5–5.2)
PROT SERPL-MCNC: 7.7 G/DL (ref 6–8.5)
RBC # BLD AUTO: 4.99 X10E6/UL (ref 3.77–5.28)
SODIUM SERPL-SCNC: 138 MMOL/L (ref 134–144)
TRIGL SERPL-MCNC: 145 MG/DL (ref 0–149)
VLDLC SERPL CALC-MCNC: 26 MG/DL (ref 5–40)
WBC # BLD AUTO: 6.7 X10E3/UL (ref 3.4–10.8)

## 2024-08-29 LAB
25(OH)D3+25(OH)D2 SERPL-MCNC: 33.9 NG/ML (ref 30–100)
MAGNESIUM SERPL-MCNC: 2.3 MG/DL (ref 1.6–2.3)
T4 FREE SERPL-MCNC: 1.17 NG/DL (ref 0.82–1.77)
TSH SERPL DL<=0.005 MIU/L-ACNC: 1.37 UIU/ML (ref 0.45–4.5)
VIT B12 SERPL-MCNC: 459 PG/ML (ref 232–1245)

## 2024-10-14 ENCOUNTER — TELEMEDICINE (OUTPATIENT)
Dept: FAMILY MEDICINE CLINIC | Facility: CLINIC | Age: 39
End: 2024-10-14
Payer: COMMERCIAL

## 2024-10-14 VITALS — HEIGHT: 71 IN | WEIGHT: 250 LBS | BODY MASS INDEX: 35 KG/M2

## 2024-10-14 DIAGNOSIS — J01.90 ACUTE NON-RECURRENT SINUSITIS, UNSPECIFIED LOCATION: Primary | ICD-10-CM

## 2024-10-14 PROCEDURE — 99213 OFFICE O/P EST LOW 20 MIN: CPT | Performed by: PHYSICIAN ASSISTANT

## 2024-10-14 RX ORDER — METHYLPREDNISOLONE 4 MG
TABLET, DOSE PACK ORAL
Qty: 21 TABLET | Refills: 0 | Status: SHIPPED | OUTPATIENT
Start: 2024-10-14

## 2024-10-14 RX ORDER — GABAPENTIN 800 MG/1
800 TABLET ORAL 3 TIMES DAILY
COMMUNITY

## 2024-10-14 RX ORDER — HYDROCODONE BITARTRATE AND ACETAMINOPHEN 10; 325 MG/1; MG/1
1 TABLET ORAL EVERY 6 HOURS PRN
COMMUNITY

## 2024-10-14 RX ORDER — DEXTROMETHORPHAN HYDROBROMIDE AND PROMETHAZINE HYDROCHLORIDE 15; 6.25 MG/5ML; MG/5ML
5 SYRUP ORAL 4 TIMES DAILY PRN
Qty: 240 ML | Refills: 0 | Status: SHIPPED | OUTPATIENT
Start: 2024-10-14 | End: 2024-10-14 | Stop reason: SDUPTHER

## 2024-10-14 RX ORDER — DEXTROMETHORPHAN HYDROBROMIDE AND PROMETHAZINE HYDROCHLORIDE 15; 6.25 MG/5ML; MG/5ML
5 SYRUP ORAL 4 TIMES DAILY PRN
Qty: 240 ML | Refills: 0 | Status: SHIPPED | OUTPATIENT
Start: 2024-10-14

## 2024-10-14 NOTE — PROGRESS NOTES
"Chief Complaint  Positive COVID 6 days ago    Subjective          Ne Byrd presents to [facility]    Patient was seen today through synchronous audio/video technology. Verbal consent was obtained. For the purpose of this visit the provider is located at [facility].  The patient was located at home. Vitals signs were not obtained due to lack of home monitoring access. Time spent with patient was 15 minutes.     History of Present Illness patient was diagnosed with COVID last week but she has continued to have severe sinus issues with facial pain and pressure, postnasal drip and cough.  She says this feels like a sinus infection now.  She also has run out of the cough medicine and would like a refill of that.    Objective   Vital Signs:   Ht 180.3 cm (71\") Comment: Pt reported vitals  Wt 113 kg (250 lb)   BMI 34.87 kg/m²     Body mass index is 34.87 kg/m².    Review of Systems   Constitutional:  Negative for chills, fatigue and fever.   HENT:  Positive for congestion, postnasal drip, sinus pressure and sore throat. Negative for swollen glands.    Respiratory:  Positive for cough. Negative for shortness of breath.    Cardiovascular:  Negative for chest pain and palpitations.   Musculoskeletal:  Negative for back pain and myalgias.   Neurological:  Negative for dizziness and headache.   Psychiatric/Behavioral:  Negative for depressed mood. The patient is not nervous/anxious.        Past History:  Medical History: has a past medical history of Arthritis, Headache, and Irritable bowel syndrome.   Surgical History: has a past surgical history that includes Dilation and curettage of uterus (2012); Dilation and curettage of uterus; and Colonoscopy.   Family History: family history includes Breast cancer in an other family member; Diabetes in her paternal grandfather.   Social History: reports that she has quit smoking. Her smoking use included cigarettes. She started " smoking about 15 years ago. She has a 3.9 pack-year smoking history. She has never used smokeless tobacco. She reports current alcohol use of about 3.0 standard drinks of alcohol per week. She reports that she does not use drugs.      Current Outpatient Medications:     Blisovi FE 1/20 1-20 MG-MCG per tablet, , Disp: , Rfl:     esomeprazole (nexIUM) 40 MG capsule, Take 1 capsule by mouth Every Morning Before Breakfast., Disp: 90 capsule, Rfl: 3    ibuprofen (ADVIL,MOTRIN) 800 MG tablet, Take 1 tablet by mouth 3 times a day., Disp: , Rfl:     promethazine-dextromethorphan (PROMETHAZINE-DM) 6.25-15 MG/5ML syrup, Take 5 mL by mouth 4 (Four) Times a Day As Needed for Cough., Disp: 240 mL, Rfl: 0    tiZANidine (ZANAFLEX) 4 MG tablet, Take 1 tablet by mouth At Night As Needed for Muscle Spasms., Disp: 30 tablet, Rfl: 3    traZODone (DESYREL) 50 MG tablet, Take 1 tablet by mouth At Night As Needed for Sleep., Disp: 30 tablet, Rfl: 11    amoxicillin-clavulanate (AUGMENTIN) 875-125 MG per tablet, Take 1 tablet by mouth 2 (Two) Times a Day for 10 days., Disp: 20 tablet, Rfl: 0    gabapentin (NEURONTIN) 800 MG tablet, Take 1 tablet by mouth 3 (Three) Times a Day., Disp: , Rfl:     HYDROcodone-acetaminophen (NORCO)  MG per tablet, Take 1 tablet by mouth Every 6 (Six) Hours As Needed for Moderate Pain., Disp: , Rfl:     methylPREDNISolone (MEDROL) 4 MG dose pack, Take as directed on package instructions., Disp: 21 tablet, Rfl: 0    Allergies: Patient has no known allergies.    Physical Exam  Constitutional:       Appearance: Normal appearance.   Neurological:      Mental Status: She is alert and oriented to person, place, and time.   Psychiatric:         Mood and Affect: Mood normal.         Behavior: Behavior normal.          Result Review :                   Assessment and Plan    Diagnoses and all orders for this visit:    1. Acute non-recurrent sinusitis, unspecified location (Primary)  Assessment & Plan:  I am  prescribing a short course of steroids, some Augmentin and Promethazine DM for a sinus infection and she can call or return if symptoms persist or worsen.      Other orders  -     amoxicillin-clavulanate (AUGMENTIN) 875-125 MG per tablet; Take 1 tablet by mouth 2 (Two) Times a Day for 10 days.  Dispense: 20 tablet; Refill: 0  -     Discontinue: promethazine-dextromethorphan (PROMETHAZINE-DM) 6.25-15 MG/5ML syrup; Take 5 mL by mouth 4 (Four) Times a Day As Needed for Cough.  Dispense: 240 mL; Refill: 0  -     methylPREDNISolone (MEDROL) 4 MG dose pack; Take as directed on package instructions.  Dispense: 21 tablet; Refill: 0  -     promethazine-dextromethorphan (PROMETHAZINE-DM) 6.25-15 MG/5ML syrup; Take 5 mL by mouth 4 (Four) Times a Day As Needed for Cough.  Dispense: 240 mL; Refill: 0        Follow Up   No follow-ups on file.  Patient was given instructions and counseling regarding her condition or for health maintenance advice. Please see specific information pulled into the AVS if appropriate.     Zayra Harp PA-C

## 2024-10-14 NOTE — ASSESSMENT & PLAN NOTE
I am prescribing a short course of steroids, some Augmentin and Promethazine DM for a sinus infection and she can call or return if symptoms persist or worsen.

## 2025-03-05 DIAGNOSIS — M62.830 BACK SPASM: ICD-10-CM

## 2025-08-03 DIAGNOSIS — K21.9 GERD WITHOUT ESOPHAGITIS: ICD-10-CM

## 2025-08-03 DIAGNOSIS — Z79.899 HIGH RISK MEDICATION USE: ICD-10-CM

## 2025-08-04 ENCOUNTER — TELEPHONE (OUTPATIENT)
Dept: FAMILY MEDICINE CLINIC | Facility: CLINIC | Age: 40
End: 2025-08-04
Payer: COMMERCIAL

## 2025-08-04 RX ORDER — ESOMEPRAZOLE MAGNESIUM 40 MG/1
40 CAPSULE, DELAYED RELEASE ORAL
Qty: 90 CAPSULE | Refills: 3 | Status: SHIPPED | OUTPATIENT
Start: 2025-08-04

## 2025-08-21 DIAGNOSIS — K21.9 GERD WITHOUT ESOPHAGITIS: ICD-10-CM

## 2025-08-21 DIAGNOSIS — Z79.899 HIGH RISK MEDICATION USE: ICD-10-CM

## 2025-08-21 RX ORDER — ESOMEPRAZOLE MAGNESIUM 40 MG/1
40 CAPSULE, DELAYED RELEASE ORAL
Qty: 90 CAPSULE | Refills: 3 | OUTPATIENT
Start: 2025-08-21